# Patient Record
Sex: MALE | Race: WHITE | URBAN - METROPOLITAN AREA
[De-identification: names, ages, dates, MRNs, and addresses within clinical notes are randomized per-mention and may not be internally consistent; named-entity substitution may affect disease eponyms.]

---

## 2018-03-01 ENCOUNTER — HOSPITAL ENCOUNTER (OUTPATIENT)
Dept: ULTRASOUND IMAGING | Facility: CLINIC | Age: 23
Discharge: HOME | End: 2018-03-01
Admitting: STUDENT IN AN ORGANIZED HEALTH CARE EDUCATION/TRAINING PROGRAM
Payer: COMMERCIAL

## 2018-03-01 DIAGNOSIS — R10.10 UPPER ABDOMINAL PAIN: ICD-10-CM

## 2018-03-01 DIAGNOSIS — R74.01 ELEVATED TRANSAMINASE LEVEL: ICD-10-CM

## 2018-03-01 DIAGNOSIS — K21.9 GERD WITHOUT ESOPHAGITIS: ICD-10-CM

## 2018-03-01 PROCEDURE — 76705 ECHO EXAM OF ABDOMEN: CPT

## 2018-08-15 ENCOUNTER — OFFICE VISIT (OUTPATIENT)
Dept: NEUROPSYCHOLOGY | Facility: CLINIC | Age: 23
End: 2018-08-15
Payer: COMMERCIAL

## 2018-08-15 DIAGNOSIS — R47.9 SPEECH DISORDER: ICD-10-CM

## 2018-08-15 DIAGNOSIS — F70 MILD MENTAL RETARDATION (I.Q. 50-70): Primary | ICD-10-CM

## 2018-08-15 NOTE — MR AVS SNAPSHOT
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 With [employer], you can request a clinic appointment, read your test results, renew a prescription or communicate with your care team.     To sign up for [employer] visit the website at [email or web address]   You will be asked to enter the access code listed below, as well as some personal information. Please follow the directions to create your username and password.     Your access code is: [email or web address]  Expires: 2018  6:30 AM     Your access code will  in 90 days. If you need help or a new code, please contact your [facility] or call [phone] for assistance.        Care EveryWhere ID     This is your Care EveryWhere ID. This could be used by other organizations to access your [facility] medical records  [id number]         Blood Pressure from Last 3 Encounters:   16 (!) 134/91    Weight from Last 3 Encounters:   16 93 kg (205 lb)              We Performed the Following     [id number] TESTING, PER HR/PSYCHOLOGIST     NEUROPSYCH TESTING BY [facility]        Primary Care Provider    None Specified       No primary provider on file.        Equal Access to Services     GRETCHEN  GAAR : Hadii tammy winkler woody Bethea, warajda luqadaha, qaybta kakat alexkylepapito, sukhi pedrozaamantrae caro. So [facility] [phone].    ATENCIÓN: Si habla español, tiene a borden disposición servicios gratuitos de asistencia lingüística. Llame al [phone].    We comply with applicable federal civil rights laws and [place] laws. We do not discriminate on the basis of race, color, national origin, age, disability, sex, sexual orientation, or gender identity.            Thank you!     Thank you for choosing [facility] NEUROPSYCHOLOGY  for your care. Our goal is always to provide you with excellent care. Hearing back from our patients is one way we can continue to improve our services. Please take a few minutes to complete the written survey that you may receive in the mail after your visit with us. Thank you!             Your Updated Medication List - Protect others around you: Learn how to safely use, store and throw away your medicines at www.disposemymeds.org.      Notice  As of 8/15/2018 11:59 PM    You have not been prescribed any medications.

## 2018-08-15 NOTE — PROGRESS NOTES
Pt was seen for neuropsychological evaluation at patient's request for the purposes of diagnostic clarification and treatment planning. 2.5 hours of face-to-face testing were provided by this writer. Please see Dr. Edna Wakefield's report for a full interpretation of the findings.    Ezio Tolentino  Psychometrist

## 2018-08-23 NOTE — PROGRESS NOTES
Service Date: 08/15/2018      NEUROPSYCHOLOGICAL EVALUATION      RELEVANT HISTORY AND REASON FOR REFERRAL:  Boubacar Begum is a 23-year-old single  man with apparently lifelong learning difficulties requiring special education.  He is receiving mental health treatment provided by LASHONAD Martinez, through [facility], diagnosed with unspecified schizophrenia and cannabis use.  He is bilingual but has marked communication problems, seemingly previously diagnosed with expressive dysphasia.  This neuropsychological evaluation is requested by Ms. Del Rio and was set up with the help of his [facility] worker, to assist with diagnostic clarification and treatment planning.      The oldest of seven children, he was born in [place], [place] but spent most of his formative years here in [place], raised by his parents who are both from [place].  His father is a , his mother a .  Mr. Begum reports he graduated from high school, having received special education services.  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  He lives in [place] with his parents and six younger siblings.      BEHAVIORAL OBSERVATIONS AND CLINICAL INTERVIEW FINDINGS:  His [facility] worker dropped him off for the appointment but was not available for the interview.  Mr. Begum presents as an obese  young man with short brown hair and a full, sparse beard, casually dressed in a white T-shirt, green plaid shorts and dirty white tennis shoes.  Grooming was marginal.  His long fingernails were unkempt.  It was very difficult communicating with him due to many mispronunciations and possible paraphasic type word substitutions, making it necessary to have him repeat himself often to be sure what he was trying to say.  Mood seemed euthymic and he was socially appropriate and fully cooperative throughout.      He is bilingual and the family speaks Omani in the home, but I  "gather he learned English at a fairly early age, certainly by the time he was in school.  He remembers getting speech therapy at school.  I did not consider him an accurate historian regarding school performance.  At first he told me he did \"fine, great\" academically, but when pressed I learned he was in special education classes and was especially poor at English.  There were no grade repeats and he thinks his command of math, spelling and reading is \"okay.\"  He claims to have gotten as high as algebra in math.      Although he held a few jobs in the distant past, I was unable to determine exactly what became of the jobs.  He was laid off, but it is unclear whether this was due to suboptimal performance or for some other reason.  It is also unclear to me why he has not held a job in the last six or seven years.      During the test session, he was hard to understand, as he did not enunciate words well and sometimes seemed to leave off the beginning or end of a word.  There were marked mispronunciations (starberry instead of strawberry).  His efforts to say [place] were far off the minerva and unrecognizable.  During testing, he said he was tired and drank coffee throughout.  Little was offered in the way of spontaneous speech.  He seemed to understand and follow directives reasonably well and was cooperative and seemed motivated to do his best.      NEUROPSYCHOLOGICAL FINDINGS:  Overall intellectual functioning, as measured by the Wechsler Adult Intelligence Scale-IV, was impaired, in the mildly mentally retarded range (prorated Full Scale IQ:  59).  Working memory, verbal reasoning and processing speeds were all impaired (prorated Verbal Comprehension Index:  69; Working Memory Index: 58; Processing Speed Index:  62).  Nonverbal reasoning was slightly, equivocally better at the bottom of the borderline impaired range (Perceptual Reasoning Index:  71).  Performance on the 10 subtests given ranged from quite impaired " to below average.  Specifically, auditory attention span on a digit sequence learning exercise (Digit Span) was very impaired.  He could repeat up to five digits in the forward direction but only two (inconsistently) in the reverse order.  Nonverbal logic (Matrix Reasoning), mathematical reasoning (Arithmetic) and word knowledge and expressive communicability (Vocabulary) were also impaired.  Speeded visual attention (Symbol Search) and speeded graphomotor learning (Coding) were also impaired.  Visuospatial processing and constructional abilities (Block Design) and factual knowledge (Information) were borderline impaired.  Nonverbal reasoning (Visual Puzzles) was below average. Unfortunately, the Similarities test (which measures verbal abstract reasoning) was erroneously discontinued prematurely, thus the need to prorate the Verbal Comprehension Index and Full Scale IQ, but he clearly struggled on that subtest as well.    Immediate memory for two story passages from the Wechsler Memory Scale-Revised was impaired with just 4/50 story elements recalled immediately after presentation.  Free recall 30 minutes later was also impaired, although most of the meager information originally learned was remembered, but his accounts clearly reflected poor  misunderstanding of the first story.  Immediate memory for figural material (four designs from the WMS) was impaired with one figure drawn in a perseverative manner.  Three of the four figures were correctly recognized when presented in multiple-choice format.      Language abilities were impaired.  Spontaneous speech is poorly articulated making it very difficult to understand him.  Comprehension, as measured by the Token Test, was impaired.  He faltered on items where he had to follow multistep commands.  Verbal associative fluency on the Controlled Oral Word Association Test (generating words beginning with target letters) was very impaired with just nine countable  responses produced across the three 60 second trials.  Semantic fluency (rapid naming of animals, fruits and vegetables) was also impaired with just 14 countable responses.  Color naming was intact.  There were errors on yes/no questions requiring comprehension of complex ideational material.  Repetition of syntactically complex sentences was very impaired.  He had difficulty pointing to body parts on command (pointing to his elbow instead of his shoulder, his neck  instead of his chin, his eyelid instead of his eyebrow) and could not consistently differentiate left from right.      Nonverbal planning and foresight, as demonstrated on a maze solving exercise (Experience Headphoness Maze Test), were low average. Finger tapping speeds were average bilaterally, the right (nondominant) hand slightly equivocally faster than the left.      Select academic skills were assessed using the Wide Range Achievement Test-4.  Spelling skills were very low at the first-grade level (standard score:  59; percentile rank: 0.3).  Reading skills were also quite impaired.  Single word reading was at the second-grade level (standard score:  61; percentile rank:  0.5) as was reading comprehension (standard score:  59; percentile rank:  0.3).  Math skills were slightly better, in the borderline impaired range, at the fourth-grade level (standard score:  7; percentile rank:  3).  He confused math symbols (multiplying instead of dividing in one instance) and could manage mostly addition and subtraction problems with inconsistent performance on problems requiring multiplication, division and fractions.      CONCLUSIONS AND RECOMMENDATIONS:  This 23-year-old man is receiving outpatient mental health treatment with a diagnosis of schizophrenia and cannabis use disorder.  Not much is known about the psychosocial history, but he reports he was born in [place] and grew up mostly in [place], reared by his parents who were originally from [place].  Guyanese was  spoken in the home, but he learned English early on and received all of his education in this country.  Apparently, he was in special education throughout and needed speech therapy.  He continues to have major communication problems with gross misarticulations to the point that it is often difficult to understand him.      Testing reveals mild intellectual impairment (prorated WAIS-IV Full Scale IQ: 59).  Verbal reasoning, working memory and processing speeds are all impaired, while nonverbal reasoning is slightly better, in the borderline impaired range.  Short and long-term retention of story passages and geometric figures is very impaired.  Memory for verbal material is confounded by the major language deficits.  Speeded word retrieval, semantic fluency and comprehension are all impaired.  He does well, scoring in the low average range, on a maze solving exercise that requires nonverbal planning and foresight.  Psychomotor speeds are average and comparable for both hands.  He is left handed.  Academic skills are generally impaired, commensurate with subnormal intellectual functioning.  Spelling is especially poor at the first-grade level, while single word reading and sentence comprehension are at the second-grade level and math skills were at the fourth-grade level.  He can solve addition and subtraction problems and inconsistently solves division and multiplication problems.      These findings and the known history suggest lifelong developmentally-based cognitive impairment, the primary diagnosis being mild mental retardation.  The language impairment is disproportionately severe, but specifically characterizing the nature of the speech impairment is beyond my expertise and calls for analysis by a speech pathologist.  Unfortunately, I know nothing about his early development which might shed light on this brain disorder.      Mr. Begum is certainly limited in the type of competitive work he could manage.  He  comprehends better than he is able to verbalize and can follow instructions.  He would learn better through hands-on practice and demonstrations and is not able to learn through written instruction.  I was unable to assess practical daily living skills, as that would require input from a reliable family member or some other individual familiar with his functioning.  That is needed to better determine if he qualifies for services for the developmentally disabled, but it seems likely he would.  Review of school records and speech therapy records would be beneficial, to better determine if he reached maximum benefit from those services or might profit from more speech therapy.             Edna Wakefield PsyD,     Clinical Neuropsychology/ABPP      The diagnostic impression is Mild mental retardation and speech disorder. This evaluation included approximately 3 hours of testing administered by a psychometrist with interpretation by a neuropsychologist (CPT code 22187) and an additional 4 hours of professional time spent on the interview, data integration, record review and report preparation (CPT code 76979).         ALYX FONTENOT             D: 2018   T: 2018   MT: WILLAM      Name:     ORIN GRIMALDO   MRN:      [id number]        Account:      [id number]   :      1995           Service Date: 08/15/2018      Document: [id number]

## 2018-08-29 NOTE — PROGRESS NOTES
WECHSLER ADULT INTELLIGENCE SCALE-IV PORTEUS MAZE TEST                             Scaled Score     Block Design  5      Test Age     14.5    Similarities  -     Test Quotient      104    Digit Span  1      Matrix Reason.  3   PSYCHOMOTOR TESTS   Vocabulary  4      Arithmetic  4    Right Left   Symbol Search  3   Finger Tapping  47   45    Visual Puzzles  7      Information  5      Coding  3       WECHSLER MEMORY SCALES   VCI:  69*   MAYI:      71     WMI:  58   PSI:      62  Logical Mem Immediate  2/2    FSIQ:  59*    Logical Mem 60 Minute  1/2     Visual Repr Immediate  3    * prorated Visual Repr 60 Minute  3     60 Minute Recognition  3    SEMANTIC FLUENCY     TOKEN TEST   Raw score  14      Score  147    WIDE RANGE ACHIEVEMENT TEST - 4                                             Standard     %ile        Grade                                             Score      Rank     Equivalent   Word Reading             61   0.5      2.4     Sentence Comp.             59   0.3      2.2      Spelling                     59   0.3      1.8     Math Computation        72   3.0      4.4      Reading Composite         59   0.3      -             CONTROLLED WORD ASSOCIATION TEST        Score  9

## 2019-01-24 ENCOUNTER — TRANSFERRED RECORDS (OUTPATIENT)
Dept: HEALTH INFORMATION MANAGEMENT | Facility: CLINIC | Age: 24
End: 2019-01-24

## 2019-02-12 ENCOUNTER — ANCILLARY PROCEDURE (OUTPATIENT)
Dept: ULTRASOUND IMAGING | Facility: CLINIC | Age: 24
End: 2019-02-12
Attending: NURSE PRACTITIONER
Payer: COMMERCIAL

## 2019-02-12 DIAGNOSIS — R79.89 ELEVATED LFTS: ICD-10-CM

## 2019-02-12 DIAGNOSIS — K76.0 NAFLD (NONALCOHOLIC FATTY LIVER DISEASE): ICD-10-CM
